# Patient Record
Sex: FEMALE | Race: WHITE | NOT HISPANIC OR LATINO | URBAN - METROPOLITAN AREA
[De-identification: names, ages, dates, MRNs, and addresses within clinical notes are randomized per-mention and may not be internally consistent; named-entity substitution may affect disease eponyms.]

---

## 2017-01-26 ENCOUNTER — TELEPHONE (OUTPATIENT)
Dept: TRANSPLANT | Facility: CLINIC | Age: 57
End: 2017-01-26

## 2017-01-26 NOTE — TELEPHONE ENCOUNTER
Nurse spoke with patient and informed her to have her doctors office fill out the form and write pancreas only on the form. Patient verbalized understanding of the above information.

## 2017-01-26 NOTE — TELEPHONE ENCOUNTER
----- Message from Lexie Mae sent at 1/26/2017  1:12 PM CST -----  Contact: pt  Called regarding pancreas txp, it states on paperwork that a kidney is needed. Call [phone]

## 2017-02-21 ENCOUNTER — TELEPHONE (OUTPATIENT)
Dept: TRANSPLANT | Facility: CLINIC | Age: 57
End: 2017-02-21